# Patient Record
Sex: FEMALE | Race: OTHER | HISPANIC OR LATINO
[De-identification: names, ages, dates, MRNs, and addresses within clinical notes are randomized per-mention and may not be internally consistent; named-entity substitution may affect disease eponyms.]

---

## 2018-02-14 ENCOUNTER — RESULT REVIEW (OUTPATIENT)
Age: 58
End: 2018-02-14

## 2018-02-27 ENCOUNTER — RESULT REVIEW (OUTPATIENT)
Age: 58
End: 2018-02-27

## 2019-02-19 ENCOUNTER — RESULT REVIEW (OUTPATIENT)
Age: 59
End: 2019-02-19

## 2019-06-25 PROBLEM — Z00.00 ENCOUNTER FOR PREVENTIVE HEALTH EXAMINATION: Noted: 2019-06-25

## 2019-07-01 ENCOUNTER — APPOINTMENT (OUTPATIENT)
Dept: ULTRASOUND IMAGING | Facility: HOSPITAL | Age: 59
End: 2019-07-01
Payer: MEDICAID

## 2019-07-01 ENCOUNTER — RESULT REVIEW (OUTPATIENT)
Age: 59
End: 2019-07-01

## 2019-07-01 ENCOUNTER — OUTPATIENT (OUTPATIENT)
Dept: OUTPATIENT SERVICES | Facility: HOSPITAL | Age: 59
LOS: 1 days | End: 2019-07-01
Payer: MEDICAID

## 2019-07-01 DIAGNOSIS — R31.9 HEMATURIA, UNSPECIFIED: ICD-10-CM

## 2019-07-01 DIAGNOSIS — Z00.8 ENCOUNTER FOR OTHER GENERAL EXAMINATION: ICD-10-CM

## 2019-07-01 PROCEDURE — 88348 ELECTRON MICROSCOPY DX: CPT | Mod: 26

## 2019-07-01 PROCEDURE — 76942 ECHO GUIDE FOR BIOPSY: CPT | Mod: 26

## 2019-07-01 PROCEDURE — 88350 IMFLUOR EA ADDL 1ANTB STN PX: CPT

## 2019-07-01 PROCEDURE — 76942 ECHO GUIDE FOR BIOPSY: CPT

## 2019-07-01 PROCEDURE — 88313 SPECIAL STAINS GROUP 2: CPT | Mod: 26

## 2019-07-01 PROCEDURE — 88313 SPECIAL STAINS GROUP 2: CPT

## 2019-07-01 PROCEDURE — 50200 RENAL BIOPSY PERQ: CPT

## 2019-07-01 PROCEDURE — 88312 SPECIAL STAINS GROUP 1: CPT

## 2019-07-01 PROCEDURE — 88346 IMFLUOR 1ST 1ANTB STAIN PX: CPT

## 2019-07-01 PROCEDURE — 88348 ELECTRON MICROSCOPY DX: CPT

## 2019-07-01 PROCEDURE — 88305 TISSUE EXAM BY PATHOLOGIST: CPT | Mod: 26

## 2019-07-01 PROCEDURE — 88305 TISSUE EXAM BY PATHOLOGIST: CPT

## 2019-07-01 PROCEDURE — 85027 COMPLETE CBC AUTOMATED: CPT

## 2019-07-01 PROCEDURE — 88312 SPECIAL STAINS GROUP 1: CPT | Mod: 26

## 2019-07-01 PROCEDURE — 88346 IMFLUOR 1ST 1ANTB STAIN PX: CPT | Mod: 26

## 2019-07-01 PROCEDURE — 50200 RENAL BIOPSY PERQ: CPT | Mod: LT

## 2019-07-01 PROCEDURE — 88350 IMFLUOR EA ADDL 1ANTB STN PX: CPT | Mod: 26

## 2019-09-19 ENCOUNTER — RESULT REVIEW (OUTPATIENT)
Age: 59
End: 2019-09-19

## 2020-07-27 ENCOUNTER — RESULT REVIEW (OUTPATIENT)
Age: 60
End: 2020-07-27

## 2020-08-12 ENCOUNTER — RESULT REVIEW (OUTPATIENT)
Age: 60
End: 2020-08-12

## 2020-12-03 ENCOUNTER — RESULT REVIEW (OUTPATIENT)
Age: 60
End: 2020-12-03

## 2021-09-02 ENCOUNTER — EMERGENCY (EMERGENCY)
Facility: HOSPITAL | Age: 61
LOS: 1 days | Discharge: HOME | End: 2021-09-02
Attending: EMERGENCY MEDICINE
Payer: MEDICAID

## 2021-09-02 VITALS
OXYGEN SATURATION: 98 % | RESPIRATION RATE: 16 BRPM | TEMPERATURE: 99 F | HEART RATE: 61 BPM | SYSTOLIC BLOOD PRESSURE: 156 MMHG | DIASTOLIC BLOOD PRESSURE: 85 MMHG

## 2021-09-02 VITALS
WEIGHT: 139.99 LBS | HEIGHT: 62 IN | SYSTOLIC BLOOD PRESSURE: 152 MMHG | TEMPERATURE: 98 F | OXYGEN SATURATION: 98 % | RESPIRATION RATE: 19 BRPM | HEART RATE: 60 BPM | DIASTOLIC BLOOD PRESSURE: 79 MMHG

## 2021-09-02 PROCEDURE — 73610 X-RAY EXAM OF ANKLE: CPT

## 2021-09-02 PROCEDURE — 99285 EMERGENCY DEPT VISIT HI MDM: CPT | Mod: 25

## 2021-09-02 PROCEDURE — 73620 X-RAY EXAM OF FOOT: CPT | Mod: 26,RT

## 2021-09-02 PROCEDURE — 73610 X-RAY EXAM OF ANKLE: CPT | Mod: 26,RT,76

## 2021-09-02 PROCEDURE — 73600 X-RAY EXAM OF ANKLE: CPT

## 2021-09-02 PROCEDURE — 73620 X-RAY EXAM OF FOOT: CPT

## 2021-09-02 PROCEDURE — 27810 TREATMENT OF ANKLE FRACTURE: CPT | Mod: RT

## 2021-09-02 PROCEDURE — 99284 EMERGENCY DEPT VISIT MOD MDM: CPT

## 2021-09-02 RX ORDER — IBUPROFEN 200 MG
400 TABLET ORAL ONCE
Refills: 0 | Status: COMPLETED | OUTPATIENT
Start: 2021-09-02 | End: 2021-09-02

## 2021-09-02 RX ADMIN — Medication 400 MILLIGRAM(S): at 02:27

## 2021-09-02 NOTE — ED PROVIDER NOTE - CLINICAL SUMMARY MEDICAL DECISION MAKING FREE TEXT BOX
Pt w/ mechanical trip w/ R ankle injury, tender at posterior medial and lateral malleoli. Per [facility] ankle, will obtain xrays. Reassess.

## 2021-09-02 NOTE — ED PROVIDER NOTE - OBJECTIVE STATEMENT
60yo F otherwise well here for ankle injury. Was walking down stairs, slipped and injured R ankle. Denies head trauma, neck pain, or loc, no blood thinners. Otherwise well, no prodromal symptoms prior to slip and fall. Denies fever, cp, sob, abdominal pain.

## 2021-09-02 NOTE — ED PROVIDER NOTE - PATIENT PORTAL LINK FT
You can access the FollowMyHealth Patient Portal offered by [facility] by registering at the following website: [email or web address]. By joining [employer]’s FollowMyHealth portal, you will also be able to view your health information using other applications (apps) compatible with our system.

## 2021-09-02 NOTE — ED ADULT NURSE REASSESSMENT NOTE - NS ED NURSE REASSESS COMMENT FT1
patient instructed on use of crutches. patient both verbalizes and demonstrates understanding and states that she is comfortable to be d/c and use them at home. MD Milan at bedside to assess. patient to be d/c

## 2021-09-02 NOTE — ED PROVIDER NOTE - NSFOLLOWUPINSTRUCTIONS_ED_ALL_ED_FT
You were seen in the Emergency Department for a fracture of your right ankle. You have a fracture that was reduced and splinted by orthopaedics. Follow up with orthopaedics in 5-7 days. Use crutches.     1) Continue all previously prescribed medications as directed.    2) Follow up with your primary care physician - take copies of your results.    3) Return to the Emergency Department for worsening or persistent symptoms, and/or ANY NEW OR CONCERNING SYMPTOMS.

## 2021-09-02 NOTE — ED ADULT NURSE NOTE - OBJECTIVE STATEMENT
patient is a 60 y/o F with hx of HTN who presents to the ED c/o right ankle pain/injury s/p mechanical slip and fall at home. patient states that she was walking in her home when she rolled her right ankle causing her to fall. patient denies head strike/LOC/blood thinner use. on exam, right ankle is swollen, tender to palpation, and painful with movement. strong peripheral pulses noted b/l. patient able to wiggle toes b/l.   patient is a&ox4, PERRL. strong peripheral pulses, strong extremities x4. patient ambulatory independently at baseline however needs assistance in [facility] ED. respirations even and unlabored with symmetrical chest rise. abdomen soft, nondistended, nontender. skin intact  patient denies CP, SOB, h/a, dizziness, weakness, numbness/tingling, vision changes, abd pain, n/v/d/c, urinary symptoms, cough, fever, chills, recent travel, or sick contacts

## 2021-09-02 NOTE — ED PROVIDER NOTE - CARE PROVIDERS DIRECT ADDRESSES
[email or web address]

## 2021-09-02 NOTE — ED PROVIDER NOTE - PHYSICAL EXAMINATION
General: NAD  HEENT: NCAT, PERRL, non-tender cspine  Cardiac: RRR, no murmurs, 2+ peripheral pulses  Chest: CTA  Abdomen: soft, non-distended, bowel sounds present, no ttp, no rebound or guarding  Extremities: no peripheral edema, calf tenderness, or leg size discrepancies +R ankle w/o obvious deformity, edematous, tender posterior lateral and medial malleoli, pulses intact, sensation intact, decreased rom.  Skin: no rashes  Neuro: AAOx3, motor and sensory grossly intact  Psych: mood and affect appropriate

## 2021-09-02 NOTE — ED PROVIDER NOTE - WR ORDER STATUS 3
Patient presents complaining of flank pain and hematuria. Patient was seen here on Monday and diagnosed with kidney stones. Pain subsided then on Tuesday started again. Resulted

## 2021-09-02 NOTE — CONSULT NOTE ADULT - SUBJECTIVE AND OBJECTIVE BOX
Orthopaedic Surgery Consult Note    Pt is a 61y Female presenting with R ankle pain s/p mechanical fall. Pt is a community ambulator at baseline. Pt has been unable to bear weight on affected extremity since time of injury. Pt denies numbness, tingling, or paresthesias in affected limb. Pt denies headstrike or LOC and denies any other orthopaedic injuries at this time. Denies fevers, dizziness, CP, SOB, N/V, calf pain.    PMHx:  MEWS Score    ANKLE INJURY    SysAdmin_VisitLink      PSHx:    Allergies:  No Known Allergies    Medications:                                  Social: Denies tobacco, EtOH, or illicit drug use.    Labs:              Vitals:  T(C): 37.1 (09-02-21 @ 00:44), Max: 37.1 (09-02-21 @ 00:44)  HR: 61 (09-02-21 @ 00:44) (60 - 61)  BP: 156/85 (09-02-21 @ 00:44) (152/79 - 156/85)  RR: 16 (09-02-21 @ 00:44) (16 - 19)  SpO2: 98% (09-02-21 @ 00:44) (98% - 98%)    Physical Exam:  Gen: NAD, AAOx3    RLE:   Skin intact  +edema, erythema, ecchymosis at ankle  +TTP over medial and lateral malleoli   Gross Bony Deformity of Ankle  No bony TTP of Hip/Knee/Foot/Toes  NT with AROM/PROM of Hip/Knee/Toes  Able to SLR  Negative logroll  +EHL/FHL/TA/GS  SILT L2-S1  +DP/PT Pulses  Compartments soft and compressible  No calf TTP B/L    Secondary Assessment:  NC/AT, NTTP of clavicles, NTTP of C-,T-,L-Spine, NTTP of Pelvis  UEs: NTTP of Shoulders, Elbows, Wrists, Hands; NT with AROM/PROM of Shoulders, Elbows, Wrists, Hands; AIN/PIN/Med/Uln/Msc/Rad/Ax intact  LLE: Able to SLR, NT with Log Roll, NT with Heel Strike, NTTP of Hip, Knee, Ankle, Foot; NT with AROM/PROM of Hip, Knee, Ankle, Foot; Q/H/Gsc/TA/EHL/FHL intact    Procedure: Procedure explained in detail to patient at bedside. Pt expressed understanding and all questions were answered. Consent for procedure was verbally obtained. Under sterile technique, 10cc 1% lidocaine were injected into the fracture site as a hematoma block. Adequate anesthesia obtained with hematoma block. Closed reduction of the fracture was performed and patient was placed into a well-padded tri-valladares splint. Pt NV intact following splint placement and post reduction XRs demonstrated adequate reduction.    Pt is a 61y Female with a R Brandan Ankle Fracture.  -Closed Fracture, NV Intact  -Pain control  -Pt placed in well-padded tri-valladares splint. NV Intact following splint placement. Discussed proper splint maintenance with pt.  -Post-reduction XR demonstrated adequate reduction.  -Rest, ice, elevate affected ankle  -NWB on affected ankle  -Discussed signs and symptoms of compartment syndrome with patient. Pt informed to follow up immediately should these signs or symptoms develop. Pt expressed understanding and all questions were answered.  -Discussed possible need for surgical fixation.  -Please follow up in office within 5-7 days of discharge from ED with Dr. Cota. Call office for appointment.  -Ortho stable for discharge.    Diego Sawyer D.O.  PGY-3 Orthopaedic Surgery

## 2021-09-02 NOTE — ED PROVIDER NOTE - PROGRESS NOTE DETAILS
STALIN Milan (PGY-2) - plaster splinted by orthopaedics, follow up with Dr. Cota in 5-7 days. Crutch and non-weight bearing.

## 2021-09-02 NOTE — ED PROVIDER NOTE - CARE PROVIDER_API CALL
Manuel Cota)  Orthopaedic Surgery  [address]  [place] [address]  Phone: [phone]  Fax: [phone]  Follow Up Time:

## 2021-09-02 NOTE — ED PROVIDER NOTE - ATTENDING CONTRIBUTION TO CARE
MD Tipton:  patient seen and evaluated personally.   I agree with the History & Physical,  Impression & Plan other than what was detailed in my note.  MD Tipton  60 y/o f not on blood thinners, slipped and fell down two stairs hurting her r ankle, mod pain, took two apap, unable to bear weight after, no pain/injuries elsewhere, did not hit head, denies n/v, cp, sob, neck/back/chest/abd pain. afebrile vitals stable  non toxic well appearing, NC/AT no max face ttp,  conjunctiva non conjected, sclera anicteric PERRL, moist mucous membranes, neck supple, no midline c/t/l/s spine ttp,  heart sounds, normal, no mrg, lungs cta b/l no wrr, no chest ttp,  abd soft non distended w/ no tenderness, pelvis stable, no visual deformities of extremities, from of all joints, no ttp, axox3, , normal mood and affect. pos swelling to r ankle lat/medial mal, ttp, nv intact distally no foot ttp, no prox tib/fib ttp, suspect ankle frx vs sprain, will get x ray, pain meds,

## 2021-09-09 ENCOUNTER — APPOINTMENT (OUTPATIENT)
Dept: ORTHOPEDIC SURGERY | Facility: CLINIC | Age: 61
End: 2021-09-09
Payer: MEDICAID

## 2021-09-09 VITALS — OXYGEN SATURATION: 98 % | BODY MASS INDEX: 25.76 KG/M2 | HEART RATE: 65 BPM | HEIGHT: 62 IN | WEIGHT: 140 LBS

## 2021-09-09 PROCEDURE — 99203 OFFICE O/P NEW LOW 30 MIN: CPT

## 2021-09-13 ENCOUNTER — TRANSCRIPTION ENCOUNTER (OUTPATIENT)
Age: 61
End: 2021-09-13

## 2021-09-13 ENCOUNTER — OUTPATIENT (OUTPATIENT)
Dept: OUTPATIENT SERVICES | Facility: HOSPITAL | Age: 61
LOS: 1 days | End: 2021-09-13
Payer: MEDICAID

## 2021-09-13 VITALS
TEMPERATURE: 98 F | SYSTOLIC BLOOD PRESSURE: 106 MMHG | HEART RATE: 56 BPM | WEIGHT: 139.99 LBS | DIASTOLIC BLOOD PRESSURE: 68 MMHG | OXYGEN SATURATION: 96 % | RESPIRATION RATE: 15 BRPM | HEIGHT: 62 IN

## 2021-09-13 DIAGNOSIS — Z01.818 ENCOUNTER FOR OTHER PREPROCEDURAL EXAMINATION: ICD-10-CM

## 2021-09-13 DIAGNOSIS — S82.891S OTHER FRACTURE OF RIGHT LOWER LEG, SEQUELA: ICD-10-CM

## 2021-09-13 DIAGNOSIS — Z11.52 ENCOUNTER FOR SCREENING FOR COVID-19: ICD-10-CM

## 2021-09-13 DIAGNOSIS — S82.891A OTHER FRACTURE OF RIGHT LOWER LEG, INITIAL ENCOUNTER FOR CLOSED FRACTURE: ICD-10-CM

## 2021-09-13 DIAGNOSIS — Z98.890 OTHER SPECIFIED POSTPROCEDURAL STATES: Chronic | ICD-10-CM

## 2021-09-13 LAB
ANION GAP SERPL CALC-SCNC: 14 MMOL/L — SIGNIFICANT CHANGE UP (ref 5–17)
BUN SERPL-MCNC: 24 MG/DL — HIGH (ref 7–23)
CALCIUM SERPL-MCNC: 10 MG/DL — SIGNIFICANT CHANGE UP (ref 8.4–10.5)
CHLORIDE SERPL-SCNC: 103 MMOL/L — SIGNIFICANT CHANGE UP (ref 96–108)
CO2 SERPL-SCNC: 23 MMOL/L — SIGNIFICANT CHANGE UP (ref 22–31)
CREAT SERPL-MCNC: 0.75 MG/DL — SIGNIFICANT CHANGE UP (ref 0.5–1.3)
GLUCOSE SERPL-MCNC: 88 MG/DL — SIGNIFICANT CHANGE UP (ref 70–99)
HCT VFR BLD CALC: 39.1 % — SIGNIFICANT CHANGE UP (ref 34.5–45)
HGB BLD-MCNC: 12.9 G/DL — SIGNIFICANT CHANGE UP (ref 11.5–15.5)
MCHC RBC-ENTMCNC: 29.3 PG — SIGNIFICANT CHANGE UP (ref 27–34)
MCHC RBC-ENTMCNC: 33 GM/DL — SIGNIFICANT CHANGE UP (ref 32–36)
MCV RBC AUTO: 88.7 FL — SIGNIFICANT CHANGE UP (ref 80–100)
NRBC # BLD: 0 /100 WBCS — SIGNIFICANT CHANGE UP (ref 0–0)
PLATELET # BLD AUTO: 254 K/UL — SIGNIFICANT CHANGE UP (ref 150–400)
POTASSIUM SERPL-MCNC: 4.5 MMOL/L — SIGNIFICANT CHANGE UP (ref 3.5–5.3)
POTASSIUM SERPL-SCNC: 4.5 MMOL/L — SIGNIFICANT CHANGE UP (ref 3.5–5.3)
RBC # BLD: 4.41 M/UL — SIGNIFICANT CHANGE UP (ref 3.8–5.2)
RBC # FLD: 14.2 % — SIGNIFICANT CHANGE UP (ref 10.3–14.5)
SODIUM SERPL-SCNC: 140 MMOL/L — SIGNIFICANT CHANGE UP (ref 135–145)
WBC # BLD: 7.26 K/UL — SIGNIFICANT CHANGE UP (ref 3.8–10.5)
WBC # FLD AUTO: 7.26 K/UL — SIGNIFICANT CHANGE UP (ref 3.8–10.5)

## 2021-09-13 PROCEDURE — 85027 COMPLETE CBC AUTOMATED: CPT

## 2021-09-13 PROCEDURE — G0463: CPT

## 2021-09-13 PROCEDURE — 80048 BASIC METABOLIC PNL TOTAL CA: CPT

## 2021-09-13 PROCEDURE — U0005: CPT

## 2021-09-13 PROCEDURE — U0003: CPT

## 2021-09-13 PROCEDURE — C9803: CPT

## 2021-09-13 RX ORDER — CEFAZOLIN SODIUM 1 G
2000 VIAL (EA) INJECTION ONCE
Refills: 0 | Status: DISCONTINUED | OUTPATIENT
Start: 2021-09-14 | End: 2021-09-28

## 2021-09-13 NOTE — H&P PST ADULT - MUSCULOSKELETAL COMMENTS
see hpi, right ankle in cast, denies edema, paresthesia, able to wiggle toes right ankle in cast, toes pink and warm to touch, pt able to wiggle toes

## 2021-09-13 NOTE — HISTORY OF PRESENT ILLNESS
[de-identified] : 61-year-old woman twisted her right ankle and is here for evaluation and discussion of treatment options.

## 2021-09-13 NOTE — PHYSICAL EXAM
[de-identified] : Comfortable in exam room \par Right ankle\par -Patient is an AO splint\par -Wiggles toes\par -Toes are warm and well-perfused\par -Sensation intact throughout the foot [de-identified] : X-rays of the right ankle from the emergency room show a medial malleolus fracture and distal fibula fracture

## 2021-09-13 NOTE — H&P PST ADULT - NSICDXPASTMEDICALHX_GEN_ALL_CORE_FT
PAST MEDICAL HISTORY:  Dyslipidemia     Hypertension      PAST MEDICAL HISTORY:  Dyslipidemia     Hypertension     LANDEN (iron deficiency anemia) required 1 unit PRBC transfusion prior to menopause when pt was in her 40's  currently resolved

## 2021-09-13 NOTE — H&P PST ADULT - HISTORY OF PRESENT ILLNESS
62 yo  female. PMH HTN, HLD. s/p 9/1/2021 fall , slip on step  presents to [facility] scheduled for ORIF right ankle on 9/14.  covid test scheduled on 9/13 at [facility]. 62 yo  female. PMH HTN, HLD.  9/1/2021 mechanical fall over steps on staircase, imaging study revealed fracture,  now presents to PST scheduled for ORIF right ankle on 9/14.  covid test scheduled on 9/13 at [facility].

## 2021-09-13 NOTE — ASSESSMENT
[FreeTextEntry1] : 60 yo woman with right unstable ankle fracture\par -Long discussion was held with the patient regarding treatment options including operative and nonoperative management pathways.  Patient shows a good understanding of her injury and treatment options available to her.  She would like to move forward with operative management of the right ankle fracture\par -Elevation\par -Continue splint\par -Nonweightbearing\par -We will book surgery for next week\par -All the patient's questions and concerns were addressed during this visit

## 2021-09-14 ENCOUNTER — OUTPATIENT (OUTPATIENT)
Dept: INPATIENT UNIT | Facility: HOSPITAL | Age: 61
LOS: 1 days | End: 2021-09-14
Payer: MEDICAID

## 2021-09-14 ENCOUNTER — APPOINTMENT (OUTPATIENT)
Dept: ORTHOPEDIC SURGERY | Facility: HOSPITAL | Age: 61
End: 2021-09-14

## 2021-09-14 VITALS
WEIGHT: 139.99 LBS | OXYGEN SATURATION: 97 % | TEMPERATURE: 98 F | HEART RATE: 54 BPM | SYSTOLIC BLOOD PRESSURE: 122 MMHG | DIASTOLIC BLOOD PRESSURE: 75 MMHG | RESPIRATION RATE: 18 BRPM | HEIGHT: 62 IN

## 2021-09-14 VITALS
SYSTOLIC BLOOD PRESSURE: 115 MMHG | OXYGEN SATURATION: 99 % | HEART RATE: 64 BPM | DIASTOLIC BLOOD PRESSURE: 62 MMHG | RESPIRATION RATE: 18 BRPM | TEMPERATURE: 98 F

## 2021-09-14 DIAGNOSIS — Z98.890 OTHER SPECIFIED POSTPROCEDURAL STATES: Chronic | ICD-10-CM

## 2021-09-14 DIAGNOSIS — S82.891S OTHER FRACTURE OF RIGHT LOWER LEG, SEQUELA: ICD-10-CM

## 2021-09-14 LAB — SARS-COV-2 RNA SPEC QL NAA+PROBE: SIGNIFICANT CHANGE UP

## 2021-09-14 PROCEDURE — 27829 TREAT LOWER LEG JOINT: CPT | Mod: RT

## 2021-09-14 PROCEDURE — 76000 FLUOROSCOPY <1 HR PHYS/QHP: CPT

## 2021-09-14 PROCEDURE — 27814 TREATMENT OF ANKLE FRACTURE: CPT | Mod: RT

## 2021-09-14 PROCEDURE — C1713: CPT

## 2021-09-14 PROCEDURE — C1769: CPT

## 2021-09-14 RX ORDER — LISINOPRIL 2.5 MG/1
1 TABLET ORAL
Qty: 0 | Refills: 0 | DISCHARGE

## 2021-09-14 RX ORDER — ACETAMINOPHEN 500 MG
2 TABLET ORAL
Qty: 0 | Refills: 0 | DISCHARGE

## 2021-09-14 RX ORDER — CHLORHEXIDINE GLUCONATE 213 G/1000ML
1 SOLUTION TOPICAL ONCE
Refills: 0 | Status: DISCONTINUED | OUTPATIENT
Start: 2021-09-14 | End: 2021-09-14

## 2021-09-14 RX ORDER — OXYCODONE HYDROCHLORIDE 5 MG/1
1 TABLET ORAL
Qty: 24 | Refills: 0
Start: 2021-09-14 | End: 2021-09-17

## 2021-09-14 RX ORDER — LIDOCAINE HCL 20 MG/ML
0.2 VIAL (ML) INJECTION ONCE
Refills: 0 | Status: DISCONTINUED | OUTPATIENT
Start: 2021-09-14 | End: 2021-09-14

## 2021-09-14 RX ORDER — CHOLECALCIFEROL (VITAMIN D3) 125 MCG
1 CAPSULE ORAL
Qty: 0 | Refills: 0 | DISCHARGE

## 2021-09-14 RX ORDER — SODIUM CHLORIDE 9 MG/ML
3 INJECTION INTRAMUSCULAR; INTRAVENOUS; SUBCUTANEOUS EVERY 8 HOURS
Refills: 0 | Status: DISCONTINUED | OUTPATIENT
Start: 2021-09-14 | End: 2021-09-14

## 2021-09-14 RX ORDER — ATORVASTATIN CALCIUM 80 MG/1
1 TABLET, FILM COATED ORAL
Qty: 0 | Refills: 0 | DISCHARGE

## 2021-09-14 RX ORDER — ASPIRIN/CALCIUM CARB/MAGNESIUM 324 MG
1 TABLET ORAL
Qty: 60 | Refills: 0
Start: 2021-09-14 | End: 2021-10-13

## 2021-09-14 RX ORDER — HYDROMORPHONE HYDROCHLORIDE 2 MG/ML
0.5 INJECTION INTRAMUSCULAR; INTRAVENOUS; SUBCUTANEOUS
Refills: 0 | Status: DISCONTINUED | OUTPATIENT
Start: 2021-09-14 | End: 2021-09-14

## 2021-09-14 RX ORDER — ONDANSETRON 8 MG/1
4 TABLET, FILM COATED ORAL ONCE
Refills: 0 | Status: DISCONTINUED | OUTPATIENT
Start: 2021-09-14 | End: 2021-09-14

## 2021-09-14 RX ADMIN — SODIUM CHLORIDE 3 MILLILITER(S): 9 INJECTION INTRAMUSCULAR; INTRAVENOUS; SUBCUTANEOUS at 07:40

## 2021-09-14 NOTE — ASU DISCHARGE PLAN (ADULT/PEDIATRIC) - CARE PROVIDER_API CALL
Gómez Leach)  Orthopedics  [address] [place] [address]  Phone: [phone]  Fax: [phone]  Follow Up Time:

## 2021-09-14 NOTE — ASU DISCHARGE PLAN (ADULT/PEDIATRIC) - ASU DC SPECIAL INSTRUCTIONSFT
Follow up with Dr. Leach in 10-14 days. Call office for appointment. Take medications as prescribed. Please take aspirin 325mg twice a day for 30 days. Keep splint clean, dry, and intact. Do not walk on splint. Crutches as needed. Non weight bearing right lower extremity Rest, ice, and elevate affected extremity.

## 2021-09-14 NOTE — CHART NOTE - NSCHARTNOTEFT_GEN_A_CORE
POC    Pt seen in PACU  No complaints @ present  Block anesthesia still in effect  Pain appears under control  No c/o SOB Chest Pain n/v     T(C): 36.4 (09-14-21 @ 12:30), Max: 36.8 (09-14-21 @ 07:29)  HR: 64 (09-14-21 @ 12:30) (54 - 75)  BP: 115/62 (09-14-21 @ 12:30) (108/60 - 122/75)  RR: 18 (09-14-21 @ 12:30) (17 - 19)  SpO2: 99% (09-14-21 @ 12:30) (95% - 99%)    O/E:   GEN: Awake, alert, in NAD  CHEST: CTAB   ABD: Soft / benign ND/ NT  EXT:  RLE        [x ] CAST/ Splint C/D         HMV [ n ]          Compartments / Calves soft         Decreased motor and sensory 2/2 anesthesia / block        digits: warm / well-perfused         cap refill brisk @ 2-3 secs             Assessment:          61yFemale  s/p Open Reduction Internal Fixation / Surgical Repair R Ankle    Plan:    -  Serial n/v checks  -  Ice / elevate  -  NWB RLE  -  DVT Proph: Ecotrin BID  -  Pain Control: IV/PO Rx  -  Dispo: d/c home      Follow up Dr Leach in office      ***See Above  Rohan CRUZ  Orthopedics  B: [phone]

## 2021-09-14 NOTE — PRE-ANESTHESIA EVALUATION ADULT - NSANTHPEFT_GEN_ALL_CORE
rrr cta bl Chonodrocutaneous Helical Advancement Flap Text: The defect edges were debeveled with a #15 scalpel blade.  Given the location of the defect and the proximity to free margins a chondrocutaneous helical advancement flap was deemed most appropriate.  Using a sterile surgical marker, the appropriate advancement flap was drawn incorporating the defect and placing the expected incisions within the relaxed skin tension lines where possible.    The area thus outlined was incised deep to adipose tissue with a #15 scalpel blade.  The skin margins were undermined to an appropriate distance in all directions utilizing iris scissors.

## 2021-09-14 NOTE — ASU PATIENT PROFILE, ADULT - NSICDXPASTMEDICALHX_GEN_ALL_CORE_FT
PAST MEDICAL HISTORY:  Dyslipidemia     Hypertension     LANDEN (iron deficiency anemia) required 1 unit PRBC transfusion prior to menopause when pt was in her 40's  currently resolved

## 2021-09-29 ENCOUNTER — APPOINTMENT (OUTPATIENT)
Dept: ORTHOPEDIC SURGERY | Facility: CLINIC | Age: 61
End: 2021-09-29
Payer: MEDICAID

## 2021-09-29 PROBLEM — E78.5 HYPERLIPIDEMIA, UNSPECIFIED: Chronic | Status: ACTIVE | Noted: 2021-09-13

## 2021-09-29 PROBLEM — D50.9 IRON DEFICIENCY ANEMIA, UNSPECIFIED: Chronic | Status: ACTIVE | Noted: 2021-09-13

## 2021-09-29 PROBLEM — I10 ESSENTIAL (PRIMARY) HYPERTENSION: Chronic | Status: ACTIVE | Noted: 2021-09-13

## 2021-09-29 PROCEDURE — 99024 POSTOP FOLLOW-UP VISIT: CPT

## 2021-09-29 NOTE — HISTORY OF PRESENT ILLNESS
[de-identified] : ORIF right ankle 9/14/2021 [de-identified] : This is a 61-year-old woman who had a bimalleolar fracture of the right ankle.  She is approximately 2 weeks status post ORIF with syndesmotic fixation.  She is tolerating her splint well and has been nonweightbearing.\par \par Her pain is much improved.  She is doing great. [de-identified] : Comfortable in exam room\par Right ankle\par -Splint removed\par -Incision clean dry and intact\par -Sutures removed\par -She is neurovascularly intact throughout the right foot and ankle [de-identified] : No x-rays were taken in the office today [de-identified] : This is a 61-year-old woman approximately 2 weeks status post ORIF right ankle with syndesmotic fixation doing well [de-identified] : -Nonweightbearing right lower extremity for another 4 weeks\par -Physical therapy for range of motion\par -Follow-up in 4 weeks with x-rays at that time\par -All the patient's questions and concerns were addressed during this visit

## 2021-10-27 ENCOUNTER — APPOINTMENT (OUTPATIENT)
Dept: ORTHOPEDIC SURGERY | Facility: CLINIC | Age: 61
End: 2021-10-27
Payer: MEDICAID

## 2021-10-27 VITALS
SYSTOLIC BLOOD PRESSURE: 147 MMHG | HEART RATE: 56 BPM | DIASTOLIC BLOOD PRESSURE: 86 MMHG | HEIGHT: 62 IN | BODY MASS INDEX: 26.68 KG/M2 | WEIGHT: 145 LBS

## 2021-10-27 PROCEDURE — 73610 X-RAY EXAM OF ANKLE: CPT | Mod: RT

## 2021-10-27 PROCEDURE — 99024 POSTOP FOLLOW-UP VISIT: CPT

## 2021-10-27 NOTE — HISTORY OF PRESENT ILLNESS
[de-identified] : ORIF right ankle 9/14/2021 [de-identified] : This is a 61-year-old woman who had a bimalleolar fracture of the right ankle.  She is approximately 1.5 months status post ORIF with syndesmotic fixation.  She is tolerating her splint well and has been nonweightbearing.\par \par Her pain is much improved.  She is doing great. [de-identified] : Comfortable in exam room\par Right ankle\par \par -Incisions are well-healed\par -Dorsiflexion to 5 degrees and plantar flexion to 50 degrees\par -She is neurovascularly intact throughout the right foot and ankle [de-identified] : X-rays of the right ankle were taken in the office today including AP, mortise view and lateral.  X-rays show good overall alignment of the ankle with no loss of reduction.  X-rays show good positioning of implants. [de-identified] : This is a 61-year-old woman approximately 6 weeks status post ORIF right ankle with syndesmotic fixation doing well [de-identified] : -Weightbearing as tolerated bilateral lower extremities\par -Physical therapy for strengthening of right ankle\par -Follow-up in 3 months with x-rays at that time\par -All the patient's questions and concerns were addressed during this visit

## 2022-02-03 ENCOUNTER — APPOINTMENT (OUTPATIENT)
Dept: ORTHOPEDIC SURGERY | Facility: CLINIC | Age: 62
End: 2022-02-03
Payer: MEDICAID

## 2022-02-03 VITALS — BODY MASS INDEX: 26.68 KG/M2 | HEIGHT: 62 IN | WEIGHT: 145 LBS

## 2022-02-03 PROCEDURE — 73610 X-RAY EXAM OF ANKLE: CPT | Mod: RT

## 2022-02-03 PROCEDURE — 99213 OFFICE O/P EST LOW 20 MIN: CPT

## 2022-05-14 NOTE — ASSESSMENT
[FreeTextEntry1] : 61-year-old woman approximately 5 months out from ORIF right ankle with syndesmotic fixation doing well\par -Weightbearing as tolerated \par -Physical therapy focusing on gait training and strengthening\par -Follow-up in 5 months with x-rays of the right ankle at that time\par -No cam boot or lace up brace needed\par -Tylenol or NSAIDs for occasional pain\par -All of her questions and concerns were addressed during this visit

## 2022-05-14 NOTE — REASON FOR VISIT
[Follow-Up Visit] : a follow-up visit for [FreeTextEntry2] : s/p Right Ankle ORIF with Syndesmotic Fixation - 09/14/2021

## 2022-05-14 NOTE — HISTORY OF PRESENT ILLNESS
[de-identified] : Patient is s/p s/p Right Ankle ORIF with Syndesmotic Fixation on 09/14/2021. Overall she is doing well with no complaints. Pain is much better. She has been working on physical therapy and doing her home exercises. She notes significant improvement in her range of motion but does have some stiffness periodically. Overall she is pleased with her outcome.  She has some discomfort along the ball of her foot when she walks which has caused her to modify her gait.

## 2022-05-14 NOTE — PHYSICAL EXAM
[de-identified] : The patient is sitting comfortably in the exam room. \par Right ankle\par -Incision is clean and dry, no erythema, no signs of infection\par -No pain with palpation over the medial malleolus\par -No pain with palpation over the dorsum of the foot, no plantar ecchymoses, no pain with movement of the first metatarsal relative to the second metatarsal\par -No subluxation of the peroneal tendons\par -Dorsiflexion: 10, Plantarflexion: 45\par -Sensation is intact L1-S1\par -5/5 EHL, FHL, TA, GS\par -Foot is warm and well-perfused, palpable dorsalis pedis pulse [de-identified] : X-rays of the right ankle were taken in the office today including AP mortise and lateral.  X-rays show good overall alignment with good positioning of implants.  No loss of reduction.

## 2022-07-06 ENCOUNTER — APPOINTMENT (OUTPATIENT)
Dept: ORTHOPEDIC SURGERY | Facility: CLINIC | Age: 62
End: 2022-07-06

## 2022-08-10 ENCOUNTER — APPOINTMENT (OUTPATIENT)
Dept: ORTHOPEDIC SURGERY | Facility: CLINIC | Age: 62
End: 2022-08-10
Payer: MEDICAID

## 2022-08-10 VITALS — HEIGHT: 62 IN | WEIGHT: 145 LBS | BODY MASS INDEX: 26.68 KG/M2

## 2022-08-10 PROCEDURE — 73610 X-RAY EXAM OF ANKLE: CPT | Mod: RT

## 2022-08-10 PROCEDURE — 99213 OFFICE O/P EST LOW 20 MIN: CPT

## 2023-01-25 NOTE — HISTORY OF PRESENT ILLNESS
[de-identified] : Patient is s/p Right Ankle ORIF with Syndesmotic Fixation on 09/14/2021. She presents today for routine followup and repeat xrays of her right ankle. Overall she is doing well with no complaints. Pain is much better. She has been working on physical therapy and doing her home exercises. She notes significant improvement in her range of motion but does have some stiffness periodically. Overall she is pleased with her outcome. She uses a tennis ball to roll along the bottom of her foot to help loosen it up when it gets tight.

## 2023-01-25 NOTE — DISCUSSION/SUMMARY
[de-identified] : 62-year-old woman nearly 1 year out from ORIF right ankle with some persistent stiffness\par \par Recommendations: \par -Weightbearing as tolerated\par -Ambulation as tolerated, return to normal activities\par -Swelling may continue for many more months, patient aware and understands\par -Followup 2 years for re-evaluation\par \par The patient was in full agreement with this plan and appreciative of their care.\par

## 2023-04-12 NOTE — H&P PST ADULT - NSANTHOSAYNRD_GEN_A_CORE
Chart reviewed with MD. Patient needs updated egd, colonoscopy, gyn/pap, doppler us, and BMD. Per discussion with , she also needs Addictive Psych consult and  follow-up regarding caregiver plan and additional psychosocial issues.     Patient notified and request to schedule as many tests/appointments as possible at [facility] or via Dr. Gaines's office.    Orders entered and appointment card completed, to be scheduled as soon as possible.   
No. ELEAZAR screening performed.  STOP BANG Legend: 0-2 = LOW Risk; 3-4 = INTERMEDIATE Risk; 5-8 = HIGH Risk

## 2023-10-11 ENCOUNTER — APPOINTMENT (OUTPATIENT)
Dept: ORTHOPEDIC SURGERY | Facility: CLINIC | Age: 63
End: 2023-10-11
Payer: MEDICAID

## 2023-10-11 DIAGNOSIS — S82.891S OTHER FRACTURE OF RIGHT LOWER LEG, SEQUELA: ICD-10-CM

## 2023-10-11 PROCEDURE — 99213 OFFICE O/P EST LOW 20 MIN: CPT | Mod: 25

## 2023-10-11 PROCEDURE — 73610 X-RAY EXAM OF ANKLE: CPT | Mod: RT

## 2024-05-23 ENCOUNTER — RX RENEWAL (OUTPATIENT)
Age: 64
End: 2024-05-23

## 2024-05-23 RX ORDER — MELOXICAM 15 MG/1
15 TABLET ORAL DAILY
Qty: 30 | Refills: 0 | Status: ACTIVE | COMMUNITY
Start: 2023-10-11 | End: 1900-01-01

## 2024-09-23 NOTE — PHYSICAL EXAM
Nephrology ([facility]) Progress Note      Patient:  Ludivina Ramirez  YOB: 1959  Date of Service: 9/23/2024  MRN: [id number]   Acct: [id number]   Primary Care Physician: Orville Weston MD  Advance Directive:   There are no questions and answers to display.     Admit Date: 9/19/2024       Hospital Day: 4  Referring Provider: No ref. provider found      Patient personally seen and examined.  Complete chart including Consults, Notes, Operative Reports, Labs, Cardiology, and Radiology studies reviewed as able.        Subjective:  Ludivina Ramirez is a 65 y.o. female for whom we were consulted for evaluation and treatment of acute kidney injury. Baseline chronic kidney disease stage 4. History of MATTY in June that required short term hemodialysis, last HD was mid-July. Patient was most recently seen by Dr Lazo in hospital on 8/23-8/26. Discharged with creatinine 2.1 which is her baseline level. She did not show up for her hospital follow up visit in the office. Other history includes hypertension, CHF, coronary artery disease. Presented to ER with four days of cramping abdominal pain. Having profuse diarrhea but no nausea or vomiting. Unable to maintain adequate PO intake during this time. Denies changes in urination. No dysuria or hematuria. No edema. No NSAID use. Labs in ER revealed creatinine 5.36, BUN 78, sodium 132. Started on bicarbonate IV fluids and admitted to medical floor. Hospital course remarkable for improvement of renal function with IV fluids.    Today is awake and alert. No new complaint, still having loose stools. Urine output nonoliguric    Allergies:  Levaquin [levofloxacin], Codeine, and Sumatriptan    Home Meds:  Medications Prior to Admission   Medication Sig Dispense Refill Last Dose    aspirin 81 MG EC tablet Take 1 tablet by mouth Daily. 90 tablet 3     calcitriol (ROCALTROL) 0.25 MCG capsule Take 1 capsule by mouth Daily.       carvedilol (COREG) 25 MG  tablet Take 1 tablet by mouth 2 (Two) Times a Day With Meals.       furosemide (LASIX) 40 MG tablet Take 1 tablet by mouth Daily.       hydroCHLOROthiazide 25 MG tablet Take 1 tablet by mouth Daily.       albuterol sulfate  (90 Base) MCG/ACT inhaler Inhale 2 puffs Every 4 (Four) Hours As Needed for Wheezing. 20.1 g 3     azelastine (ASTELIN) 0.1 % nasal spray 2 sprays into the nostril(s) as directed by provider 2 (Two) Times a Day. 30 mL 11     cloNIDine (CATAPRES) 0.1 MG tablet Take 1 tablet by mouth Every 6 (Six) Hours As Needed for High Blood Pressure (SBP >160). 30 tablet 1     docusate sodium (COLACE) 250 MG capsule Take 1 capsule by mouth Daily.       fluticasone (FLONASE) 50 MCG/ACT nasal spray 2 sprays into the nostril(s) as directed by provider Every Morning. In each nostril 16 g 11     isosorbide dinitrate (ISORDIL) 40 MG tablet Take 1 tablet by mouth 2 (Two) Times a Day. 180 tablet 3     losartan (COZAAR) 100 MG tablet Take 1 tablet by mouth Daily.       melatonin 3 MG tablet Take 2 tablets by mouth Every Night. 30 tablet 0     nitroglycerin (NITROSTAT) 0.4 MG SL tablet Place 1 tablet under the tongue Every 5 (Five) Minutes As Needed for Chest Pain. Take no more than 3 doses in 15 minutes.          Medicines:  Current Facility-Administered Medications   Medication Dose Route Frequency Provider Last Rate Last Admin    acetaminophen (TYLENOL) tablet 650 mg  650 mg Oral Q6H PRN Alfonso Walker MD   650 mg at 09/22/24 2054    azelastine (ASTELIN) nasal spray 2 spray  2 spray Each Nare BID Orville Weston MD   2 spray at 09/23/24 0833    calcitriol (ROCALTROL) capsule 0.25 mcg  0.25 mcg Oral Daily Orville Weston MD   0.25 mcg at 09/23/24 0833    carvedilol (COREG) tablet 6.25 mg  6.25 mg Oral BID With Meals Orville Weston MD   6.25 mg at 09/23/24 0833    cefTRIAXone (ROCEPHIN) 1,000 mg in sodium chloride 0.9 % 100 mL MBP  1,000 mg Intravenous Q24H Orville Weston   [de-identified] : The patient is sitting comfortably in the exam room. \par Right ankle\par -Incision is clean and dry, no erythema, no signs of infection\par -No pain with palpation over the medial malleolus\par -No pain with palpation over the dorsum of the foot, no plantar ecchymoses, no pain with movement of the first metatarsal relative to the second metatarsal\par -No subluxation of the peroneal tendons\par -Dorsiflexion: 10, Plantarflexion: 45\par -Sensation is intact L1-S1\par -5/5 EHL, FHL, TA, GS\par -Foot is warm and well-perfused, palpable dorsalis pedis pulse mL/hr at 09/22/24 1208 1,000 mg at 09/22/24 1208    cloNIDine (CATAPRES) tablet 0.1 mg  0.1 mg Oral 4x Daily PRN Alfonso Walker MD   0.1 mg at 09/23/24 0442    guaiFENesin (MUCINEX) 12 hr tablet 600 mg  600 mg Oral Q12H PRN Orville Weston MD   600 mg at 09/22/24 2054    heparin (porcine) 5000 UNIT/ML injection 5,000 Units  5,000 Units Subcutaneous Q12H Orville Weston MD   5,000 Units at 09/23/24 0833    HYDROcodone-acetaminophen (NORCO) 5-325 MG per tablet 1 tablet  1 tablet Oral Q6H PRN Orville Weston MD   1 tablet at 09/23/24 0442    ipratropium-albuterol (DUO-NEB) nebulizer solution 3 mL  3 mL Nebulization 4x Daily - RT Orville Weston MD   3 mL at 09/23/24 0624    isosorbide dinitrate (ISORDIL) tablet 40 mg  40 mg Oral BID - Nitrates Orville Weston MD   40 mg at 09/23/24 0832    lactated ringers infusion  100 mL/hr Intravenous Continuous Quinn Rodarte APRN 100 mL/hr at 09/22/24 1824 100 mL/hr at 09/22/24 1824    melatonin tablet 2.5 mg  2.5 mg Oral Nightly PRN Orville Weston MD   2.5 mg at 09/22/24 2054    metroNIDAZOLE (FLAGYL) tablet 500 mg  500 mg Oral Q8H Orville Weston MD   500 mg at 09/23/24 0442    ondansetron (ZOFRAN) injection 4 mg  4 mg Intravenous Q6H PRN Alfonso Walker MD   4 mg at 09/23/24 0256    saccharomyces boulardii (FLORASTOR) capsule 250 mg  250 mg Oral BID Paras Mchugh MD   250 mg at 09/23/24 0832       Past Medical History:  Past Medical History:   Diagnosis Date    Acute CHF     Acute renal failure (ARF)     Anemia     Asthma     childhood    Bowel disease, inflammatory     Chronic kidney disease     Coronary artery disease     Hypertension     Ischemic colitis     Lung nodule, multiple 04/03/2024    Metabolic acidosis     Myocardial infarction 11/07/2020    Nonrheumatic aortic (valve) insufficiency     PTSD (post-traumatic stress disorder)        Past Surgical History:  Past Surgical History:   Procedure Laterality Date     CARDIAC CATHETERIZATION N/A 2020    Procedure: Left Heart Cath;  Surgeon: Pierce Buchanan MD;  Location:  [facility];  Service: Cardiovascular;  Laterality: N/A;    CLOSED REDUCTION ANKLE FRACTURE Right     13 screws and a plate    EXTERNAL FIXATION WRIST FRACTURE      INSERTION HEMODIALYSIS CATHETER Right 2022    Procedure: INSERTION OF RIGHT INTERNAL JUGULAR HEMODIALYSIS CATHETER;  Surgeon: Dexter Red MD;  Location:  [facility] OR;  Service: Vascular;  Laterality: Right;    TUBAL ABDOMINAL LIGATION         Family History  Family History   Problem Relation Age of Onset    Coronary artery disease Mother     Coronary artery disease Father     Breast cancer Neg Hx        Social History  Social History     Socioeconomic History    Marital status: Single   Tobacco Use    Smoking status: Every Day     Current packs/day: 0.00     Average packs/day: 0.5 packs/day for 48.7 years (24.4 ttl pk-yrs)     Types: Cigarettes     Start date:      Last attempt to quit: 2023     Years since quittin.9     Passive exposure: Past    Smokeless tobacco: Never    Tobacco comments:     Smokes about 0.5 pack daily 4/10    Vaping Use    Vaping status: Never Used   Substance and Sexual Activity    Alcohol use: No    Drug use: No    Sexual activity: Defer       Review of Systems:  History obtained from chart review and the patient  General ROS: No fever or chills  Respiratory ROS: No cough, shortness of breath, wheezing  Cardiovascular ROS: No chest pain or palpitations  Gastrointestinal ROS: positive for - diarrhea  No abdominal pain or melena  Genito-Urinary ROS: No dysuria or hematuria  Psych ROS: No anxiety and depression  14 point ROS reviewed with the patient and negative except as noted above and in the HPI unless unable to obtain.    Objective:  Patient Vitals for the past 24 hrs:   BP Temp Temp src Pulse Resp SpO2   24 0741 177/94 97.6 °F (36.4 °C) Oral 98 18 94 %   24  [de-identified] : 3 xray views of the right ankle were obtained. Orthopaedic hardware in good alignment. No osseous abnormalities. 0630 -- -- -- 90 18 91 %   09/23/24 0624 -- -- -- 92 20 94 %   09/23/24 0438 (!) 191/107 98.3 °F (36.8 °C) Oral 97 18 96 %   09/23/24 0300 -- -- -- -- -- 96 %   09/22/24 2358 -- -- -- -- -- 94 %   09/22/24 2034 158/81 98.3 °F (36.8 °C) Oral 78 16 96 %   09/22/24 1821 -- -- -- 77 16 96 %   09/22/24 1810 -- -- -- 74 16 95 %   09/22/24 1452 164/82 98.5 °F (36.9 °C) Oral 78 16 96 %   09/22/24 1444 -- -- -- 77 16 96 %   09/22/24 1438 -- -- -- 76 18 97 %   09/22/24 1007 -- -- -- 86 18 94 %   09/22/24 0958 -- -- -- 84 18 97 %       Intake/Output Summary (Last 24 hours) at 9/23/2024 0907  Last data filed at 9/22/2024 2054  Gross per 24 hour   Intake 1710 ml   Output --   Net 1710 ml     General: awake/alert   Chest:  clear to auscultation bilaterally without respiratory distress  CVS: regular rate and rhythm  Abdominal: soft, nontender, positive bowel sounds  Extremities: no cyanosis or edema  Skin: warm and dry without rash      Labs:  Results from last 7 days   Lab Units 09/19/24  1458   WBC 10*3/mm3 4.46   HEMOGLOBIN g/dL 13.0   HEMATOCRIT % 41.9   PLATELETS 10*3/mm3 192         Results from last 7 days   Lab Units 09/23/24  0452 09/22/24  1006 09/21/24  0731 09/20/24  0500 09/19/24  1458   SODIUM mmol/L 143 140 138   < > 132*   POTASSIUM mmol/L 3.8 3.5 3.4*   < > 3.7   CHLORIDE mmol/L 109* 107 103   < > 97*   CO2 mmol/L 24.0 21.0* 24.0   < > 20.0*   BUN mg/dL 32* 30* 51*   < > 78*   CREATININE mg/dL 2.38* 2.10* 2.95*   < > 5.36*   CALCIUM mg/dL 8.6 8.2* 8.3*   < > 8.0*   EGFR mL/min/1.73 22.3* 25.9* 17.2*   < > 8.4*   BILIRUBIN mg/dL  --   --   --   --  0.4   ALK PHOS U/L  --   --   --   --  91   ALT (SGPT) U/L  --   --   --   --  10   AST (SGOT) U/L  --   --   --   --  14   GLUCOSE mg/dL 119* 120* 92   < > 102*    < > = values in this interval not displayed.       Radiology:   Imaging Results (Last 72 Hours)       Procedure Component Value Units Date/Time    XR Forearm 2 View Left [[id number]] Collected: 09/22/24 1440  "    Updated: 09/22/24 1615    Narrative:      EXAMINATION:  XR FOREARM 2 VW LEFT-  9/22/2024 2:29 PM     HISTORY: The patient fell. Left forearm pain.     COMPARISON: No comparison study.     TECHNIQUE: 2 views were obtained.     FINDINGS: No acute fracture is seen of the radius or the ulna. The  visualized carpal bones appear to be intact. There is widening of the  scapholunate space. There is degenerative change at the first  carpal-metacarpal joint. There is mild dorsal tilting of the lunate on  the lateral image.          Impression:      1. No acute fracture is seen.  2. Widening of the scapholunate space and mild dorsal tilting of the  lunate may be seen with scapholunate ligament instability and dorsal  anterior cannulated segment instability.  3. Degenerative osteoarthritis at the first carpal-metacarpal joint.     This report was signed and finalized on 9/22/2024 4:12 PM by Dr. Jefe Beltre MD.               Culture:  No results found for: \"BLOODCX\", \"URINECX\", \"WOUNDCX\", \"MRSACX\", \"RESPCX\", \"STOOLCX\"      Assessment    Acute kidney injury, prerenal--improved  Baseline chronic kidney disease stage 4  Hyponatremia--resolved  Metabolic acidosis--resolved  Hypertension  Gastroenteritis due to Salmonella     Plan:   Renal function a bit worse today but still close to baseline level. Will continue IV fluids for now but should be able to wean soon  Monitor labs      Quinn Rodarte, ISAIAH  9/23/2024  09:07 CDT    "
